# Patient Record
Sex: MALE | Race: OTHER | Employment: OTHER | URBAN - METROPOLITAN AREA
[De-identification: names, ages, dates, MRNs, and addresses within clinical notes are randomized per-mention and may not be internally consistent; named-entity substitution may affect disease eponyms.]

---

## 2020-12-03 NOTE — PATIENT DISCUSSION
12/03/2020OS+0.75+0.2530+[id number]&nbsp;SN &nbsp; &nbsp; [facility]

## 2021-05-24 ENCOUNTER — NEW PATIENT COMPREHENSIVE (OUTPATIENT)
Dept: URBAN - METROPOLITAN AREA CLINIC 40 | Facility: CLINIC | Age: 66
End: 2021-05-24

## 2021-05-24 DIAGNOSIS — H25.812: ICD-10-CM

## 2021-05-24 DIAGNOSIS — E11.9: ICD-10-CM

## 2021-05-24 DIAGNOSIS — H52.4: ICD-10-CM

## 2021-05-24 DIAGNOSIS — H52.223: ICD-10-CM

## 2021-05-24 DIAGNOSIS — H18.513: ICD-10-CM

## 2021-05-24 DIAGNOSIS — H52.03: ICD-10-CM

## 2021-05-24 DIAGNOSIS — H25.811: ICD-10-CM

## 2021-05-24 PROCEDURE — 92015 DETERMINE REFRACTIVE STATE: CPT

## 2021-05-24 PROCEDURE — 92004 COMPRE OPH EXAM NEW PT 1/>: CPT

## 2021-05-24 ASSESSMENT — VISUAL ACUITY
OS_SC: J10
OS_SC: 20/30-1
OS_BAT: 20/70
OD_BAT: 20/70
OD_SC: J10
OS_CC: 20/25-1
OD_CC: J2
OS_CC: J3
OD_SC: 20/30-2
OD_CC: 20/30+2

## 2021-05-24 ASSESSMENT — TONOMETRY
OD_IOP_MMHG: 16
OS_IOP_MMHG: 17

## 2021-06-11 ENCOUNTER — CATARACT CONSULT (OUTPATIENT)
Dept: URBAN - METROPOLITAN AREA CLINIC 39 | Facility: CLINIC | Age: 66
End: 2021-06-11

## 2021-06-11 DIAGNOSIS — H18.513: ICD-10-CM

## 2021-06-11 DIAGNOSIS — E11.9: ICD-10-CM

## 2021-06-11 DIAGNOSIS — H25.812: ICD-10-CM

## 2021-06-11 DIAGNOSIS — H25.811: ICD-10-CM

## 2021-06-11 PROCEDURE — V2799PMN IMPRIMIS PRED-MOXI-NEPAF 5ML

## 2021-06-11 PROCEDURE — 99214 OFFICE O/P EST MOD 30 MIN: CPT

## 2021-06-11 PROCEDURE — 92025-1 CORNEAL TOPOGRAPHY, INS

## 2021-06-11 PROCEDURE — 92286 ANT SGM IMG I&R SPECLR MIC: CPT

## 2021-06-11 PROCEDURE — 92136TC INTERFEROMETRY - TECHNICAL COMPONENT

## 2021-06-11 ASSESSMENT — VISUAL ACUITY
OD_RAM: 20/20
OD_BAT: 20/70
OS_AM: 20/20
OS_SC: J12
OD_SC: 20/40+1
OS_SC: 20/40-2
OD_SC: <J12
OS_BAT: 20/70

## 2021-06-11 ASSESSMENT — TONOMETRY
OD_IOP_MMHG: 17
OS_IOP_MMHG: 17

## 2021-06-24 ENCOUNTER — DIAGNOSTICS ONLY (OUTPATIENT)
Dept: URBAN - METROPOLITAN AREA CLINIC 39 | Facility: CLINIC | Age: 66
End: 2021-06-24

## 2021-06-24 DIAGNOSIS — H25.812: ICD-10-CM

## 2021-06-24 DIAGNOSIS — E11.9: ICD-10-CM

## 2021-06-24 DIAGNOSIS — H25.811: ICD-10-CM

## 2021-06-24 DIAGNOSIS — H18.513: ICD-10-CM

## 2021-06-24 PROCEDURE — 92025 CPTRIZED CORNEAL TOPOGRAPHY: CPT

## 2021-06-24 PROCEDURE — 92136TC INTERFEROMETRY - TECHNICAL COMPONENT

## 2021-06-24 PROCEDURE — 99211T TECH SERVICE

## 2021-06-28 ENCOUNTER — SURGERY/PROCEDURE (OUTPATIENT)
Dept: URBAN - METROPOLITAN AREA CLINIC 39 | Facility: CLINIC | Age: 66
End: 2021-06-28

## 2021-06-28 ENCOUNTER — PRE-OP/H&P (OUTPATIENT)
Dept: URBAN - METROPOLITAN AREA CLINIC 39 | Facility: CLINIC | Age: 66
End: 2021-06-28

## 2021-06-28 DIAGNOSIS — H25.811: ICD-10-CM

## 2021-06-28 PROCEDURE — 66984 XCAPSL CTRC RMVL W/O ECP: CPT

## 2021-06-28 PROCEDURE — 99211T TECH SERVICE

## 2021-06-29 ENCOUNTER — POST OP/EVAL OF SECOND EYE (OUTPATIENT)
Dept: URBAN - METROPOLITAN AREA CLINIC 38 | Facility: CLINIC | Age: 66
End: 2021-06-29

## 2021-06-29 DIAGNOSIS — H25.812: ICD-10-CM

## 2021-06-29 DIAGNOSIS — H18.513: ICD-10-CM

## 2021-06-29 DIAGNOSIS — E11.9: ICD-10-CM

## 2021-06-29 DIAGNOSIS — Z96.1: ICD-10-CM

## 2021-06-29 PROCEDURE — 92012 INTRM OPH EXAM EST PATIENT: CPT

## 2021-06-29 ASSESSMENT — TONOMETRY
OD_IOP_MMHG: 15
OS_IOP_MMHG: 17

## 2021-06-29 ASSESSMENT — VISUAL ACUITY
OD_SC: J10
OS_BAT: 20/70
OS_SC: J6
OS_SC: 20/20-2
OD_SC: 20/30

## 2021-07-12 ENCOUNTER — PRE-OP/H&P (OUTPATIENT)
Dept: URBAN - METROPOLITAN AREA CLINIC 39 | Facility: CLINIC | Age: 66
End: 2021-07-12

## 2021-07-12 ENCOUNTER — SURGERY/PROCEDURE (OUTPATIENT)
Dept: URBAN - METROPOLITAN AREA CLINIC 39 | Facility: CLINIC | Age: 66
End: 2021-07-12

## 2021-07-12 DIAGNOSIS — Z96.1: ICD-10-CM

## 2021-07-12 DIAGNOSIS — H25.812: ICD-10-CM

## 2021-07-12 PROCEDURE — 66984 XCAPSL CTRC RMVL W/O ECP: CPT

## 2021-07-12 PROCEDURE — 99211T TECH SERVICE

## 2021-07-12 ASSESSMENT — VISUAL ACUITY
OD_SC: 20/25+1
OS_SC: 20/30+2

## 2021-07-12 ASSESSMENT — TONOMETRY
OD_IOP_MMHG: 14
OS_IOP_MMHG: 18

## 2021-07-13 ENCOUNTER — CATARACT POST-OP 1-DAY (OUTPATIENT)
Dept: URBAN - METROPOLITAN AREA CLINIC 39 | Facility: CLINIC | Age: 66
End: 2021-07-13

## 2021-07-13 DIAGNOSIS — Z96.1: ICD-10-CM

## 2021-07-13 PROCEDURE — 99024 POSTOP FOLLOW-UP VISIT: CPT

## 2021-07-13 ASSESSMENT — TONOMETRY
OS_IOP_MMHG: 18
OD_IOP_MMHG: 14

## 2021-07-13 ASSESSMENT — VISUAL ACUITY
OS_SC: 20/40+2
OD_SC: 20/25

## 2021-08-06 ENCOUNTER — POST-OP CATARACT (OUTPATIENT)
Dept: URBAN - METROPOLITAN AREA CLINIC 39 | Facility: CLINIC | Age: 66
End: 2021-08-06

## 2021-08-06 DIAGNOSIS — Z96.1: ICD-10-CM

## 2021-08-06 PROCEDURE — 99024 POSTOP FOLLOW-UP VISIT: CPT

## 2021-08-06 ASSESSMENT — VISUAL ACUITY
OS_SC: J3
OS_SC: 20/25
OD_SC: 20/25-2
OD_SC: J8

## 2021-08-06 ASSESSMENT — TONOMETRY
OS_IOP_MMHG: 14
OD_IOP_MMHG: 14

## 2022-01-10 ENCOUNTER — COMPREHENSIVE EXAM (OUTPATIENT)
Dept: URBAN - METROPOLITAN AREA CLINIC 40 | Facility: CLINIC | Age: 67
End: 2022-01-10

## 2022-01-10 DIAGNOSIS — H26.491: ICD-10-CM

## 2022-01-10 DIAGNOSIS — H18.513: ICD-10-CM

## 2022-01-10 DIAGNOSIS — H52.223: ICD-10-CM

## 2022-01-10 DIAGNOSIS — H52.03: ICD-10-CM

## 2022-01-10 DIAGNOSIS — E11.9: ICD-10-CM

## 2022-01-10 DIAGNOSIS — H52.4: ICD-10-CM

## 2022-01-10 DIAGNOSIS — H26.492: ICD-10-CM

## 2022-01-10 PROCEDURE — 92014 COMPRE OPH EXAM EST PT 1/>: CPT

## 2022-01-10 PROCEDURE — 92015 DETERMINE REFRACTIVE STATE: CPT

## 2022-01-10 ASSESSMENT — VISUAL ACUITY
OS_SC: 20/25-1
OD_SC: J10
OD_SC: 20/25
OU_SC: 20/20
OU_SC: J5
OS_SC: J5

## 2022-01-10 ASSESSMENT — TONOMETRY
OS_IOP_MMHG: 16
OD_IOP_MMHG: 16

## 2024-03-11 ENCOUNTER — COMPREHENSIVE EXAM (OUTPATIENT)
Dept: URBAN - METROPOLITAN AREA CLINIC 40 | Facility: CLINIC | Age: 69
End: 2024-03-11

## 2024-03-11 DIAGNOSIS — H26.493: ICD-10-CM

## 2024-03-11 DIAGNOSIS — H52.223: ICD-10-CM

## 2024-03-11 DIAGNOSIS — H52.4: ICD-10-CM

## 2024-03-11 DIAGNOSIS — E11.9: ICD-10-CM

## 2024-03-11 DIAGNOSIS — H18.513: ICD-10-CM

## 2024-03-11 DIAGNOSIS — H52.03: ICD-10-CM

## 2024-03-11 PROCEDURE — 92014 COMPRE OPH EXAM EST PT 1/>: CPT

## 2024-03-11 PROCEDURE — 92015 DETERMINE REFRACTIVE STATE: CPT

## 2024-03-11 ASSESSMENT — VISUAL ACUITY
OS_SC: 20/30-1
OD_SC: J12
OS_SC: J12
OD_SC: 20/25-1

## 2024-03-11 ASSESSMENT — TONOMETRY
OS_IOP_MMHG: 15
OD_IOP_MMHG: 12